# Patient Record
Sex: FEMALE | URBAN - METROPOLITAN AREA
[De-identification: names, ages, dates, MRNs, and addresses within clinical notes are randomized per-mention and may not be internally consistent; named-entity substitution may affect disease eponyms.]

---

## 2020-10-19 ENCOUNTER — OFFICE VISIT (OUTPATIENT)
Dept: URBAN - METROPOLITAN AREA CLINIC 86 | Facility: CLINIC | Age: 75
End: 2020-10-19
Payer: MEDICARE

## 2020-10-19 PROCEDURE — 67028 INJECTION EYE DRUG: CPT | Performed by: OPHTHALMOLOGY

## 2020-10-19 PROCEDURE — 92014 COMPRE OPH EXAM EST PT 1/>: CPT | Performed by: OPHTHALMOLOGY

## 2020-10-19 PROCEDURE — 92134 CPTRZ OPH DX IMG PST SGM RTA: CPT | Performed by: OPHTHALMOLOGY

## 2020-10-19 ASSESSMENT — INTRAOCULAR PRESSURE
OS: 13
OD: 16

## 2020-10-19 NOTE — IMPRESSION/PLAN
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
s/p avastin OD 2/25/20 (lost to follow up between 2/25/20 to 10/19/20) OCT: 
OD: PED, tr SRF
OS: scar Plan: Rec Avastin OD today RTC 3 months OCT OU reeval Avastin

## 2021-01-11 ENCOUNTER — OFFICE VISIT (OUTPATIENT)
Dept: URBAN - METROPOLITAN AREA CLINIC 86 | Facility: CLINIC | Age: 76
End: 2021-01-11
Payer: MEDICARE

## 2021-01-11 PROCEDURE — 67028 INJECTION EYE DRUG: CPT | Performed by: OPHTHALMOLOGY

## 2021-01-11 PROCEDURE — 99214 OFFICE O/P EST MOD 30 MIN: CPT | Performed by: OPHTHALMOLOGY

## 2021-01-11 PROCEDURE — 92134 CPTRZ OPH DX IMG PST SGM RTA: CPT | Performed by: OPHTHALMOLOGY

## 2021-01-11 ASSESSMENT — INTRAOCULAR PRESSURE
OD: 14
OS: 19

## 2021-01-11 NOTE — IMPRESSION/PLAN
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
s/p avastin OD 10/19/20 (lost to follow up between 2/25/20 to 10/19/20) OCT: 1/11/21 OD: PED, tr SRF
OS: scar Plan:  The diagnosis, natural history, and prognosis of wet AMD, as well as the risks and benefits of various treatment options including laser, PDT, Avastin, Lucentis and Eylea; along with the alternatives of observation or participation in a clinical trial, were discussed at length. The patient understands that treatment may not improve vision, but should reduce the risk of further visual loss. The patient understands that smoking is the most significant modifiable risk factor for the development of advanced AMD, and also understands that if they do smoke (or have history of smoking in the past 5 years), they cannot take vitamin A/beta-carotene, since it may increase their risk for lung cancer. Given stability of vision and exam, pt elects to proceed with Avastin injection OD.  

RTC 3 months DFE OU OCT OU Re-eval Avastin

## 2021-04-19 ENCOUNTER — OFFICE VISIT (OUTPATIENT)
Dept: URBAN - METROPOLITAN AREA CLINIC 86 | Facility: CLINIC | Age: 76
End: 2021-04-19
Payer: MEDICARE

## 2021-04-19 PROCEDURE — 99214 OFFICE O/P EST MOD 30 MIN: CPT | Performed by: OPHTHALMOLOGY

## 2021-04-19 PROCEDURE — 67028 INJECTION EYE DRUG: CPT | Performed by: OPHTHALMOLOGY

## 2021-04-19 PROCEDURE — 92134 CPTRZ OPH DX IMG PST SGM RTA: CPT | Performed by: OPHTHALMOLOGY

## 2021-04-19 ASSESSMENT — INTRAOCULAR PRESSURE
OD: 15
OS: 14

## 2021-04-19 NOTE — IMPRESSION/PLAN
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
s/p avastin OD 10/19/20 (lost to follow up between 2/25/20 to 10/19/20) -s/p Avastin 01/11/21 OCT: 04/19/21 OD: PED, tr SRF
OS: scar Plan: The diagnosis, natural history, and prognosis of wet AMD, as well as the risks and benefits of various treatment options including laser, PDT, Avastin, Lucentis and Eylea; along with the alternatives of observation or participation in a clinical trial, were discussed at length. The patient understands that treatment may not improve vision, but should reduce the risk of further visual loss. The patient understands that smoking is the most significant modifiable risk factor for the development of advanced AMD, and also understands that if they do smoke (or have history of smoking in the past 5 years), they cannot take vitamin A/beta-carotene, since it may increase their risk for lung cancer. Given stability of vision and exam, pt elects to proceed with Avastin injection OD.  

RTC 3 months DFE OU OCT OU Re-eval Avastin

## 2021-07-26 ENCOUNTER — OFFICE VISIT (OUTPATIENT)
Dept: URBAN - METROPOLITAN AREA CLINIC 86 | Facility: CLINIC | Age: 76
End: 2021-07-26
Payer: MEDICARE

## 2021-07-26 DIAGNOSIS — H35.3231 EXUDATIVE AGE-REL MCLR DEGN, BI, WITH ACTV CHRDL NEOVAS: Primary | ICD-10-CM

## 2021-07-26 PROCEDURE — 67028 INJECTION EYE DRUG: CPT | Performed by: OPHTHALMOLOGY

## 2021-07-26 PROCEDURE — 92134 CPTRZ OPH DX IMG PST SGM RTA: CPT | Performed by: OPHTHALMOLOGY

## 2021-07-26 PROCEDURE — 99214 OFFICE O/P EST MOD 30 MIN: CPT | Performed by: OPHTHALMOLOGY

## 2021-07-26 ASSESSMENT — INTRAOCULAR PRESSURE
OS: 9
OD: 11

## 2021-07-26 NOTE — IMPRESSION/PLAN
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
s/p avastin OD 10/19/20 (lost to follow up between 2/25/20 to 10/19/20) -s/p Avastin 04/19/21 OCT: 07/26/21 OD: PED, tr SRF
OS: scar Plan: The diagnosis, natural history, and prognosis of wet AMD, as well as the risks and benefits of various treatment options including laser, PDT, Avastin, Lucentis and Eylea; along with the alternatives of observation or participation in a clinical trial, were discussed at length. The patient understands that treatment may not improve vision, but should reduce the risk of further visual loss. The patient understands that smoking is the most significant modifiable risk factor for the development of advanced AMD, and also understands that if they do smoke (or have history of smoking in the past 5 years), they cannot take vitamin A/beta-carotene, since it may increase their risk for lung cancer. Given stability of vision and exam, pt elects to proceed with Avastin injection OD.  

RTC 3 months DFE OU OCT OU Re-eval Avastin

## 2021-11-01 ENCOUNTER — OFFICE VISIT (OUTPATIENT)
Dept: URBAN - METROPOLITAN AREA CLINIC 86 | Facility: CLINIC | Age: 76
End: 2021-11-01
Payer: MEDICARE

## 2021-11-01 PROCEDURE — 99214 OFFICE O/P EST MOD 30 MIN: CPT | Performed by: OPHTHALMOLOGY

## 2021-11-01 PROCEDURE — 92134 CPTRZ OPH DX IMG PST SGM RTA: CPT | Performed by: OPHTHALMOLOGY

## 2021-11-01 PROCEDURE — 67028 INJECTION EYE DRUG: CPT | Performed by: OPHTHALMOLOGY

## 2021-11-01 ASSESSMENT — INTRAOCULAR PRESSURE
OD: 9
OS: 10

## 2021-11-01 NOTE — IMPRESSION/PLAN
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
s/p avastin OD 10/19/20 (lost to follow up between 2/25/20 to 10/19/20) -s/p Avastin 07/26/21 OCT: 11/01/21 OD: PED, tr SRF
OS: scar Plan: The diagnosis, natural history, and prognosis of wet AMD, as well as the risks and benefits of various treatment options including laser, PDT, Avastin, Lucentis and Eylea; along with the alternatives of observation or participation in a clinical trial, were discussed at length. The patient understands that treatment may not improve vision, but should reduce the risk of further visual loss. The patient understands that smoking is the most significant modifiable risk factor for the development of advanced AMD, and also understands that if they do smoke (or have history of smoking in the past 5 years), they cannot take vitamin A/beta-carotene, since it may increase their risk for lung cancer. Given stability of vision and exam, pt elects to proceed with Avastin injection OD.  

RTC 3 months DFE OU OCT OU Re-eval Avastin

## 2022-02-07 ENCOUNTER — OFFICE VISIT (OUTPATIENT)
Dept: URBAN - METROPOLITAN AREA CLINIC 86 | Facility: CLINIC | Age: 77
End: 2022-02-07
Payer: MEDICARE

## 2022-02-07 PROCEDURE — 67028 INJECTION EYE DRUG: CPT | Performed by: OPHTHALMOLOGY

## 2022-02-07 PROCEDURE — 92134 CPTRZ OPH DX IMG PST SGM RTA: CPT | Performed by: OPHTHALMOLOGY

## 2022-02-07 PROCEDURE — 99214 OFFICE O/P EST MOD 30 MIN: CPT | Performed by: OPHTHALMOLOGY

## 2022-02-07 ASSESSMENT — INTRAOCULAR PRESSURE
OD: 16
OS: 14

## 2022-02-07 NOTE — IMPRESSION/PLAN
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
(lost to follow up between 2/25/20 to 10/19/20) -s/p Avastin 11/01/21 OCT: 02/07/22 OD: PED, tr SRF
OS: scar Plan: The diagnosis, natural history, and prognosis of wet AMD, as well as the risks and benefits of various treatment options including laser, PDT, Avastin, Lucentis and Eylea; along with the alternatives of observation or participation in a clinical trial, were discussed at length. The patient understands that treatment may not improve vision, but should reduce the risk of further visual loss. The patient understands that smoking is the most significant modifiable risk factor for the development of advanced AMD, and also understands that if they do smoke (or have history of smoking in the past 5 years), they cannot take vitamin A/beta-carotene, since it may increase their risk for lung cancer. Given stability of vision and exam, pt elects to proceed with Avastin injection OD.  

RTC 3 months DFE OU OCT OU Re-eval Avastin

## 2022-05-06 ENCOUNTER — OFFICE VISIT (OUTPATIENT)
Dept: URBAN - METROPOLITAN AREA CLINIC 86 | Facility: CLINIC | Age: 77
End: 2022-05-06
Payer: MEDICARE

## 2022-05-06 DIAGNOSIS — H35.3231 EXUDATIVE AGE-RELATED MACULAR DEGENERATION, BILATERAL, WITH ACTIVE CHOROIDAL NEOVASCULARIZATION: Primary | ICD-10-CM

## 2022-05-06 PROCEDURE — 67028 INJECTION EYE DRUG: CPT | Performed by: OPHTHALMOLOGY

## 2022-05-06 PROCEDURE — 99214 OFFICE O/P EST MOD 30 MIN: CPT | Performed by: OPHTHALMOLOGY

## 2022-05-06 PROCEDURE — 92134 CPTRZ OPH DX IMG PST SGM RTA: CPT | Performed by: OPHTHALMOLOGY

## 2022-05-06 ASSESSMENT — INTRAOCULAR PRESSURE
OS: 18
OD: 16

## 2022-05-06 NOTE — IMPRESSION/PLAN
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
(lost to follow up between 2/25/20 to 10/19/20) -s/p Avastin OD 02/07/22 OCT: 05/06/22 OD: PED, tr SRF
OS: scar Plan: The diagnosis, natural history, and prognosis of wet AMD, as well as the risks and benefits of various treatment options including laser, PDT, Avastin, Lucentis and Eylea; along with the alternatives of observation or participation in a clinical trial, were discussed at length. The patient understands that treatment may not improve vision, but should reduce the risk of further visual loss. The patient understands that smoking is the most significant modifiable risk factor for the development of advanced AMD, and also understands that if they do smoke (or have history of smoking in the past 5 years), they cannot take vitamin A/beta-carotene, since it may increase their risk for lung cancer. Given stability of vision and exam, pt elects to proceed with Avastin injection OD.  

RTC 3 months DFE OU OCT OU Re-eval Avastin

## 2022-08-12 ENCOUNTER — OFFICE VISIT (OUTPATIENT)
Dept: URBAN - METROPOLITAN AREA CLINIC 86 | Facility: CLINIC | Age: 77
End: 2022-08-12
Payer: MEDICARE

## 2022-08-12 DIAGNOSIS — H35.3231 EXUDATIVE AGE-RELATED MACULAR DEGENERATION, BILATERAL, WITH ACTIVE CHOROIDAL NEOVASCULARIZATION: Primary | ICD-10-CM

## 2022-08-12 PROCEDURE — 99214 OFFICE O/P EST MOD 30 MIN: CPT | Performed by: OPHTHALMOLOGY

## 2022-08-12 PROCEDURE — 67028 INJECTION EYE DRUG: CPT | Performed by: OPHTHALMOLOGY

## 2022-08-12 PROCEDURE — 92134 CPTRZ OPH DX IMG PST SGM RTA: CPT | Performed by: OPHTHALMOLOGY

## 2022-08-12 ASSESSMENT — INTRAOCULAR PRESSURE
OD: 15
OS: 13

## 2022-08-12 NOTE — IMPRESSION/PLAN
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
(lost to follow up between 2/25/20 to 10/19/20) -s/p Avastin OD 05/06/22 OCT: 08/12/22 OD: PED, tr SRF
OS: scar Plan: The diagnosis, natural history, and prognosis of wet AMD, as well as the risks and benefits of various treatment options including laser, PDT, Avastin, Lucentis and Eylea; along with the alternatives of observation or participation in a clinical trial, were discussed at length. The patient understands that treatment may not improve vision, but should reduce the risk of further visual loss. The patient understands that smoking is the most significant modifiable risk factor for the development of advanced AMD, and also understands that if they do smoke (or have history of smoking in the past 5 years), they cannot take vitamin A/beta-carotene, since it may increase their risk for lung cancer. Given stability of vision and exam, pt elects to proceed with Avastin injection OD.  

RTC 3 months DFE OU OCT OU Re-eval Avastin

## 2022-11-04 ENCOUNTER — OFFICE VISIT (OUTPATIENT)
Dept: URBAN - METROPOLITAN AREA CLINIC 86 | Facility: CLINIC | Age: 77
End: 2022-11-04
Payer: MEDICARE

## 2022-11-04 DIAGNOSIS — H35.3231 EXUDATIVE AGE-REL MCLR DEGN, BI, WITH ACTV CHRDL NEOVAS: Primary | ICD-10-CM

## 2022-11-04 PROCEDURE — 99214 OFFICE O/P EST MOD 30 MIN: CPT | Performed by: OPHTHALMOLOGY

## 2022-11-04 PROCEDURE — 67028 INJECTION EYE DRUG: CPT | Performed by: OPHTHALMOLOGY

## 2022-11-04 PROCEDURE — 92134 CPTRZ OPH DX IMG PST SGM RTA: CPT | Performed by: OPHTHALMOLOGY

## 2022-11-04 ASSESSMENT — INTRAOCULAR PRESSURE
OS: 18
OD: 16

## 2022-11-04 NOTE — IMPRESSION/PLAN
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
(lost to follow up between 2/25/20 to 10/19/20) -s/p Avastin OD 08/12/22 OCT: 11/14/22 OD: PED, tr SRF
OS: scar Plan: The diagnosis, natural history, and prognosis of wet AMD, as well as the risks and benefits of various treatment options including laser, PDT, Avastin, Lucentis and Eylea; along with the alternatives of observation or participation in a clinical trial, were discussed at length. The patient understands that treatment may not improve vision, but should reduce the risk of further visual loss. The patient understands that smoking is the most significant modifiable risk factor for the development of advanced AMD, and also understands that if they do smoke (or have history of smoking in the past 5 years), they cannot take vitamin A/beta-carotene, since it may increase their risk for lung cancer. Given stability of vision and exam, pt elects to proceed with Avastin injection OD.  

RTC 3 months DFE OU OCT OU Re-eval Avastin

## 2023-02-10 ENCOUNTER — OFFICE VISIT (OUTPATIENT)
Dept: URBAN - METROPOLITAN AREA CLINIC 86 | Facility: CLINIC | Age: 78
End: 2023-02-10
Payer: MEDICARE

## 2023-02-10 DIAGNOSIS — H35.3231 EXUDATIVE AGE-REL MCLR DEGN, BI, WITH ACTV CHRDL NEOVAS: Primary | ICD-10-CM

## 2023-02-10 PROCEDURE — 92134 CPTRZ OPH DX IMG PST SGM RTA: CPT | Performed by: OPHTHALMOLOGY

## 2023-02-10 PROCEDURE — 67028 INJECTION EYE DRUG: CPT | Performed by: OPHTHALMOLOGY

## 2023-02-10 ASSESSMENT — INTRAOCULAR PRESSURE
OD: 15
OS: 14

## 2023-02-10 NOTE — IMPRESSION/PLAN
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
(lost to follow up between 2/25/20 to 10/19/20) -s/p Avastin OD 11/04/22 OCT: 02/10/23 OD: PED, tr SRF
OS: scar Plan: The diagnosis, natural history, and prognosis of wet AMD, as well as the risks and benefits of various treatment options including laser, PDT, Avastin, Lucentis and Eylea; along with the alternatives of observation or participation in a clinical trial, were discussed at length. The patient understands that treatment may not improve vision, but should reduce the risk of further visual loss. The patient understands that smoking is the most significant modifiable risk factor for the development of advanced AMD, and also understands that if they do smoke (or have history of smoking in the past 5 years), they cannot take vitamin A/beta-carotene, since it may increase their risk for lung cancer. Given stability of vision and exam, pt elects to proceed with Avastin injection OD.  

RTC 3 months DFE OU OCT OU Re-eval Avastin

## 2023-08-25 ENCOUNTER — PROCEDURE (OUTPATIENT)
Dept: URBAN - METROPOLITAN AREA CLINIC 86 | Facility: CLINIC | Age: 78
End: 2023-08-25
Payer: MEDICARE

## 2023-08-25 DIAGNOSIS — H35.3231 EXUDATIVE AGE-RELATED MACULAR DEGENERATION, BILATERAL, WITH ACTIVE CHOROIDAL NEOVASCULARIZATION: Primary | ICD-10-CM

## 2023-08-25 PROCEDURE — 67028 INJECTION EYE DRUG: CPT | Performed by: OPHTHALMOLOGY

## 2023-08-25 PROCEDURE — 92134 CPTRZ OPH DX IMG PST SGM RTA: CPT | Performed by: OPHTHALMOLOGY

## 2023-08-25 ASSESSMENT — INTRAOCULAR PRESSURE
OS: 19
OD: 20

## 2023-11-17 ENCOUNTER — Encounter (OUTPATIENT)
Dept: URBAN - METROPOLITAN AREA CLINIC 86 | Facility: CLINIC | Age: 78
End: 2023-11-17
Payer: MEDICARE

## 2023-11-17 PROCEDURE — 67028 INJECTION EYE DRUG: CPT | Performed by: OPHTHALMOLOGY

## 2023-11-17 PROCEDURE — 92134 CPTRZ OPH DX IMG PST SGM RTA: CPT | Performed by: OPHTHALMOLOGY

## 2024-02-16 ENCOUNTER — OFFICE VISIT (OUTPATIENT)
Dept: URBAN - METROPOLITAN AREA CLINIC 86 | Facility: CLINIC | Age: 79
End: 2024-02-16
Payer: MEDICARE

## 2024-02-16 DIAGNOSIS — H35.3231 EXUDATIVE AGE-RELATED MACULAR DEGENERATION, BILATERAL, WITH ACTIVE CHOROIDAL NEOVASCULARIZATION: Primary | ICD-10-CM

## 2024-02-16 PROCEDURE — 67028 INJECTION EYE DRUG: CPT | Performed by: OPHTHALMOLOGY

## 2024-02-16 PROCEDURE — 92134 CPTRZ OPH DX IMG PST SGM RTA: CPT | Performed by: OPHTHALMOLOGY

## 2024-02-16 ASSESSMENT — INTRAOCULAR PRESSURE
OS: 14
OD: 13

## 2024-02-26 ENCOUNTER — OFFICE VISIT (OUTPATIENT)
Dept: URBAN - METROPOLITAN AREA CLINIC 86 | Facility: CLINIC | Age: 79
End: 2024-02-26
Payer: MEDICARE

## 2024-02-26 DIAGNOSIS — H35.3231 EXUDATIVE AGE-RELATED MACULAR DEGENERATION, BILATERAL, WITH ACTIVE CHOROIDAL NEOVASCULARIZATION: Primary | ICD-10-CM

## 2024-02-26 PROCEDURE — 99213 OFFICE O/P EST LOW 20 MIN: CPT | Performed by: OPHTHALMOLOGY

## 2024-02-26 ASSESSMENT — INTRAOCULAR PRESSURE
OS: 14
OD: 10

## 2024-05-10 ENCOUNTER — OFFICE VISIT (OUTPATIENT)
Dept: URBAN - METROPOLITAN AREA CLINIC 86 | Facility: CLINIC | Age: 79
End: 2024-05-10
Payer: MEDICARE

## 2024-05-10 DIAGNOSIS — H35.3231 EXUDATIVE AGE-RELATED MACULAR DEGENERATION, BILATERAL, WITH ACTIVE CHOROIDAL NEOVASCULARIZATION: Primary | ICD-10-CM

## 2024-05-10 PROCEDURE — 67028 INJECTION EYE DRUG: CPT | Performed by: OPHTHALMOLOGY

## 2024-05-10 PROCEDURE — 92134 CPTRZ OPH DX IMG PST SGM RTA: CPT | Performed by: OPHTHALMOLOGY

## 2024-05-10 ASSESSMENT — INTRAOCULAR PRESSURE
OD: 14
OS: 15

## 2024-08-02 ENCOUNTER — OFFICE VISIT (OUTPATIENT)
Dept: URBAN - METROPOLITAN AREA CLINIC 86 | Facility: CLINIC | Age: 79
End: 2024-08-02
Payer: MEDICARE

## 2024-08-02 DIAGNOSIS — H35.3231 EXUDATIVE AGE-RELATED MACULAR DEGENERATION, BILATERAL, WITH ACTIVE CHOROIDAL NEOVASCULARIZATION: Primary | ICD-10-CM

## 2024-08-02 DIAGNOSIS — H25.13 AGE-RELATED NUCLEAR CATARACT, BILATERAL: ICD-10-CM

## 2024-08-02 PROCEDURE — 92134 CPTRZ OPH DX IMG PST SGM RTA: CPT | Performed by: OPHTHALMOLOGY

## 2024-08-02 PROCEDURE — 67028 INJECTION EYE DRUG: CPT | Performed by: OPHTHALMOLOGY

## 2024-08-02 PROCEDURE — 99213 OFFICE O/P EST LOW 20 MIN: CPT | Performed by: OPHTHALMOLOGY

## 2024-08-02 ASSESSMENT — INTRAOCULAR PRESSURE
OS: 16
OD: 15

## 2024-11-01 ENCOUNTER — OFFICE VISIT (OUTPATIENT)
Dept: URBAN - METROPOLITAN AREA CLINIC 86 | Facility: CLINIC | Age: 79
End: 2024-11-01
Payer: MEDICARE

## 2024-11-01 DIAGNOSIS — H35.3231 EXUDATIVE AGE-RELATED MACULAR DEGENERATION, BILATERAL, WITH ACTIVE CHOROIDAL NEOVASCULARIZATION: Primary | ICD-10-CM

## 2024-11-01 PROCEDURE — 92134 CPTRZ OPH DX IMG PST SGM RTA: CPT | Performed by: OPHTHALMOLOGY

## 2024-11-01 PROCEDURE — 67028 INJECTION EYE DRUG: CPT | Performed by: OPHTHALMOLOGY

## 2024-11-01 ASSESSMENT — INTRAOCULAR PRESSURE
OS: 13
OD: 15

## 2025-01-27 ENCOUNTER — OFFICE VISIT (OUTPATIENT)
Dept: URBAN - METROPOLITAN AREA CLINIC 86 | Facility: CLINIC | Age: 80
End: 2025-01-27
Payer: MEDICARE

## 2025-01-27 DIAGNOSIS — Z96.1 PRESENCE OF INTRAOCULAR LENS: ICD-10-CM

## 2025-01-27 DIAGNOSIS — H35.3231 EXUDATIVE AGE-RELATED MACULAR DEGENERATION, BILATERAL, WITH ACTIVE CHOROIDAL NEOVASCULARIZATION: Primary | ICD-10-CM

## 2025-01-27 PROCEDURE — 99213 OFFICE O/P EST LOW 20 MIN: CPT | Performed by: OPHTHALMOLOGY

## 2025-01-27 PROCEDURE — 67028 INJECTION EYE DRUG: CPT | Performed by: OPHTHALMOLOGY

## 2025-01-27 PROCEDURE — 92134 CPTRZ OPH DX IMG PST SGM RTA: CPT | Performed by: OPHTHALMOLOGY

## 2025-01-27 ASSESSMENT — INTRAOCULAR PRESSURE
OS: 11
OD: 12

## 2025-04-21 ENCOUNTER — OFFICE VISIT (OUTPATIENT)
Dept: URBAN - METROPOLITAN AREA CLINIC 86 | Facility: CLINIC | Age: 80
End: 2025-04-21
Payer: MEDICARE

## 2025-04-21 DIAGNOSIS — H35.3231 EXUDATIVE AGE-RELATED MACULAR DEGENERATION, BILATERAL, WITH ACTIVE CHOROIDAL NEOVASCULARIZATION: Primary | ICD-10-CM

## 2025-04-21 PROCEDURE — 92134 CPTRZ OPH DX IMG PST SGM RTA: CPT | Performed by: OPHTHALMOLOGY

## 2025-04-21 PROCEDURE — 67028 INJECTION EYE DRUG: CPT | Performed by: OPHTHALMOLOGY

## 2025-04-21 ASSESSMENT — INTRAOCULAR PRESSURE
OS: 13
OD: 14

## 2025-07-14 ENCOUNTER — OFFICE VISIT (OUTPATIENT)
Dept: URBAN - METROPOLITAN AREA CLINIC 86 | Facility: CLINIC | Age: 80
End: 2025-07-14
Payer: MEDICARE

## 2025-07-14 DIAGNOSIS — H35.3231 EXUDATIVE AGE-REL MCLR DEGN, BI, WITH ACTV CHRDL NEOVAS: Primary | ICD-10-CM

## 2025-07-14 DIAGNOSIS — Z96.1 PRESENCE OF INTRAOCULAR LENS: ICD-10-CM

## 2025-07-14 PROCEDURE — 92134 CPTRZ OPH DX IMG PST SGM RTA: CPT | Performed by: OPHTHALMOLOGY

## 2025-07-14 PROCEDURE — 67028 INJECTION EYE DRUG: CPT | Performed by: OPHTHALMOLOGY

## 2025-07-14 ASSESSMENT — INTRAOCULAR PRESSURE
OS: 15
OD: 14